# Patient Record
Sex: MALE | Race: WHITE | NOT HISPANIC OR LATINO | Employment: FULL TIME | URBAN - NONMETROPOLITAN AREA
[De-identification: names, ages, dates, MRNs, and addresses within clinical notes are randomized per-mention and may not be internally consistent; named-entity substitution may affect disease eponyms.]

---

## 2023-10-03 ENCOUNTER — OFFICE VISIT (OUTPATIENT)
Dept: OTOLARYNGOLOGY | Facility: CLINIC | Age: 33
End: 2023-10-03
Payer: COMMERCIAL

## 2023-10-03 DIAGNOSIS — J00 ACUTE RHINITIS: ICD-10-CM

## 2023-10-03 DIAGNOSIS — R04.0 EPISTAXIS: Primary | ICD-10-CM

## 2023-10-03 DIAGNOSIS — J34.89 NASAL CRUSTING: ICD-10-CM

## 2023-10-03 PROCEDURE — 99203 OFFICE O/P NEW LOW 30 MIN: CPT | Performed by: OTOLARYNGOLOGY

## 2023-10-03 PROCEDURE — 31231 NASAL ENDOSCOPY DX: CPT | Performed by: OTOLARYNGOLOGY

## 2023-10-03 RX ORDER — BACITRACIN 500 [USP'U]/G
1 OINTMENT TOPICAL 3 TIMES DAILY
Qty: 9 G | Refills: 0 | Status: SHIPPED | OUTPATIENT
Start: 2023-10-03 | End: 2023-11-02

## 2023-10-03 NOTE — PROGRESS NOTES
Subjective   Patient ID: Dhaval Gregorio is a 33 y.o. male who presents for chronic nose bleeds.    Subjective   Patient ID: Dhaval Gregorio is a 33 y.o. male who presents for chronic nose bleeds.    HPI  He has nosebleeds for about a month. He was getting nosebleeds 3-4 times a week. Left side of his nose was cauterized and was packed last week. His nose bleed again. He had a rhinorocket placed by ER, it was removed yesterday.    He is not taking any NSAIDS.    Review of Systems  History of nosebleeds.    Objective   Physical Exam  General appearance: Healthy-appearing, well-nourished, well groomed, in no acute distress.     Head and Face: Atraumatic with no masses, lesions, or scarring.      Salivary glands: No tenderness of the parotid glands or parotid masses.     No tenderness of the submandibular glands or submandibular masses.      Facial strength: Normal strength and symmetry, no synkinesis or facial tic.     Eyes: Conjunctivas look non-hyperemic bilaterally    Ears: Bilaterally ear canals look normal. Tympanic membranes look intact, no hyperemia, fluid or retraction. Hearing grossly normal.      Nose: Please see endoscopy    Oral Cavity/Mouth: Lips and tongue look normal.     Throat: No postnasal discharge. No tonsil hypertrophy. No hyperemia.    Neck: Symmetrical, trachea midline.     Pulmonary: Normal respiratory effort.     Lymphatic: No palpable pathologic lymph nodes at neck.     Neurological/Psychiatric Orientation to person, place, and time: Normal.     Mood and affect: Normal.      Extremities: No clubbing.     Skin: No significant skin lesions were noted at face or neck    NASAL ENDOSCOPY 10.03.2023  0 degree nasal endoscope was advanced through patient's nasal cavities. On examination patient's septum was deviated to right (anteriorly) and left (inferiorly). Mucosa looked pale normal hyperemic. Crusting /scabbing was (+) anteriorly at left. There was whitish powder on it. No polyps, but  purulent secretions were observed.     Assessment/Plan   1- Continue oral keflex as prescribed by outside health facility  2-bacitracin with zinc ointment 3 times a day inside the nose for 3 weeks  3-use Afrin nasal spray on a piece of cotton and applied inside the nose for active nosebleeds  4-follow-up in 3 weeks             He has nosebleeds for about a month. He was getting nosebleeds 3-4 times a week. Left side of his nose was cauterized and was packed last week. His nose bleed again. He had a rhinorocket placed by ER, it was removed yesterday.    He is not taking any NSAIDS.    Review of Systems  History of nosebleeds.    Objective   Physical Exam  General appearance: Healthy-appearing, well-nourished, well groomed, in no acute distress.     Head and Face: Atraumatic with no masses, lesions, or scarring.      Salivary glands: No tenderness of the parotid glands or parotid masses.     No tenderness of the submandibular glands or submandibular masses.      Facial strength: Normal strength and symmetry, no synkinesis or facial tic.     Eyes: Conjunctivas look non-hyperemic bilaterally    Ears: Bilaterally ear canals look normal. Tympanic membranes look intact, no hyperemia, fluid or retraction. Hearing grossly normal.      Nose: Please see endoscopy    Oral Cavity/Mouth: Lips and tongue look normal.     Throat: No postnasal discharge. No tonsil hypertrophy. No hyperemia.    Neck: Symmetrical, trachea midline.     Pulmonary: Normal respiratory effort.     Lymphatic: No palpable pathologic lymph nodes at neck.     Neurological/Psychiatric Orientation to person, place, and time: Normal.     Mood and affect: Normal.      Extremities: No clubbing.     Skin: No significant skin lesions were noted at face or neck    NASAL ENDOSCOPY 10.03.2023  0 degree nasal endoscope was advanced through patient's nasal cavities. On examination patient's septum was deviated to right (anteriorly) and left (inferiorly). Mucosa looked  pale normal hyperemic. Crusting /scabbing was (+) anteriorly at left. There was whitish powder on it. No polyps, but purulent secretions were observed.     Assessment/Plan   1- Continue oral keflex as prescribed by outside health facility  2-bacitracin with zinc ointment 3 times a day inside the nose for 3 weeks  3-use Afrin nasal spray on a piece of cotton and applied inside the nose for active nosebleeds  4-follow-up in 3 weeks

## 2023-10-03 NOTE — PROGRESS NOTES
Subjective   Patient ID: Dhaval Gregorio is a 33 y.o. male who presents for chronic nose bleeds.    Subjective   Patient ID: Dhaval Gregorio is a 33 y.o. male who presents for chronic nose bleeds.     HPI  He has nosebleeds for about a month. He was getting nosebleeds 3-4 times a week. Left side of his nose was cauterized and was packed last week. His nose bleed again. He had a rhinorocket placed by ER, it was removed yesterday.     He is not taking any NSAIDS.     Review of Systems  History of nosebleeds.     Objective   Physical Exam  General appearance: Healthy-appearing, well-nourished, well groomed, in no acute distress.      Head and Face: Atraumatic with no masses, lesions, or scarring.       Salivary glands: No tenderness of the parotid glands or parotid masses.      No tenderness of the submandibular glands or submandibular masses.       Facial strength: Normal strength and symmetry, no synkinesis or facial tic.      Eyes: Conjunctivas look non-hyperemic bilaterally     Ears: Bilaterally ear canals look normal. Tympanic membranes look intact, no hyperemia, fluid or retraction. Hearing grossly normal.       Nose: Please see endoscopy     Oral Cavity/Mouth: Lips and tongue look normal.      Throat: No postnasal discharge. No tonsil hypertrophy. No hyperemia.     Neck: Symmetrical, trachea midline.      Pulmonary: Normal respiratory effort.      Lymphatic: No palpable pathologic lymph nodes at neck.      Neurological/Psychiatric Orientation to person, place, and time: Normal.     Mood and affect: Normal.       Extremities: No clubbing.      Skin: No significant skin lesions were noted at face or neck     NASAL ENDOSCOPY 10.03.2023  0 degree nasal endoscope was advanced through patient's nasal cavities. On examination patient's septum was deviated to right (anteriorly) and left (inferiorly). Mucosa looked pale normal hyperemic. Crusting /scabbing was (+) anteriorly at left. There was whitish powder on  it. No polyps, but purulent secretions were observed.      Assessment/Plan   1- Continue oral keflex as prescribed by outside health facility  2-bacitracin with zinc ointment 3 times a day inside the nose for 3 weeks  3-use Afrin nasal spray on a piece of cotton and applied inside the nose for active nosebleeds  4-follow-up in 3 weeks                 He has nosebleeds for about a month. He was getting nosebleeds 3-4 times a week. Left side of his nose was cauterized and was packed last week. His nose bleed again. He had a rhinorocket placed by ER, it was removed yesterday.     He is not taking any NSAIDS.     Review of Systems  History of nosebleeds.           Objective   Physical Exam  General appearance: Healthy-appearing, well-nourished, well groomed, in no acute distress.      Head and Face: Atraumatic with no masses, lesions, or scarring.       Salivary glands: No tenderness of the parotid glands or parotid masses.      No tenderness of the submandibular glands or submandibular masses.       Facial strength: Normal strength and symmetry, no synkinesis or facial tic.      Eyes: Conjunctivas look non-hyperemic bilaterally     Ears: Bilaterally ear canals look normal. Tympanic membranes look intact, no hyperemia, fluid or retraction. Hearing grossly normal.       Nose: Please see endoscopy     Oral Cavity/Mouth: Lips and tongue look normal.      Throat: No postnasal discharge. No tonsil hypertrophy. No hyperemia.     Neck: Symmetrical, trachea midline.      Pulmonary: Normal respiratory effort.      Lymphatic: No palpable pathologic lymph nodes at neck.      Neurological/Psychiatric Orientation to person, place, and time: Normal.     Mood and affect: Normal.       Extremities: No clubbing.      Skin: No significant skin lesions were noted at face or neck     NASAL ENDOSCOPY 10.03.2023  0 degree nasal endoscope was advanced through patient's nasal cavities. On examination patient's septum was deviated to right  (anteriorly) and left (inferiorly). Mucosa looked pale normal hyperemic. Crusting /scabbing was (+) anteriorly at left. There was whitish powder on it. No polyps, but purulent secretions were observed.            Assessment/Plan   1- Continue oral keflex as prescribed by outside health facility  2-bacitracin with zinc ointment 3 times a day inside the nose for 3 weeks  3-use Afrin nasal spray on a piece of cotton and applied inside the nose for active nosebleeds  4-follow-up in 3 weeks                  My Note 9:56 AM    Edit     Delete     Copy    Expand All Collapse All     Subjective   Patient ID: Dhaval Gregorio is a 33 y.o. male who presents for chronic nose bleeds.     HPI  He has nosebleeds for about a month. He was getting nosebleeds 3-4 times a week. Left side of his nose was cauterized and was packed last week. His nose bleed again. He had a rhinorocket placed by ER, it was removed yesterday.     He is not taking any NSAIDS.     Review of Systems  History of nosebleeds.           Objective   Physical Exam  General appearance: Healthy-appearing, well-nourished, well groomed, in no acute distress.      Head and Face: Atraumatic with no masses, lesions, or scarring.       Salivary glands: No tenderness of the parotid glands or parotid masses.      No tenderness of the submandibular glands or submandibular masses.       Facial strength: Normal strength and symmetry, no synkinesis or facial tic.      Eyes: Conjunctivas look non-hyperemic bilaterally     Ears: Bilaterally ear canals look normal. Tympanic membranes look intact, no hyperemia, fluid or retraction. Hearing grossly normal.       Nose: Please see endoscopy     Oral Cavity/Mouth: Lips and tongue look normal.      Throat: No postnasal discharge. No tonsil hypertrophy. No hyperemia.     Neck: Symmetrical, trachea midline.      Pulmonary: Normal respiratory effort.      Lymphatic: No palpable pathologic lymph nodes at neck.       Neurological/Psychiatric Orientation to person, place, and time: Normal.     Mood and affect: Normal.       Extremities: No clubbing.      Skin: No significant skin lesions were noted at face or neck     NASAL ENDOSCOPY 10.03.2023  0 degree nasal endoscope was advanced through patient's nasal cavities. On examination patient's septum was deviated to right (anteriorly) and left (inferiorly). Mucosa looked pale normal hyperemic. Crusting /scabbing was (+) anteriorly at left. There was whitish powder on it. No polyps, but purulent secretions were observed.            Assessment/Plan   1- Continue oral keflex as prescribed by outside health facility  2-bacitracin with zinc ointment 3 times a day inside the nose for 3 weeks  3-use Afrin nasal spray on a piece of cotton and applied inside the nose for active nosebleeds  4-follow-up in 3 weeks          Epistaxis (Nose Bleed)   The bleeding has been from both nares. This is a recurrent problem.     He has nosebleeds for about a month. He was getting nosebleeds 3-4 times a week. Left side of his nose was cauterized and was packed last week. His nose bleed again. He had a rhinorocket placed by ER, it was removed yesterday.    He is not taking any NSAIDS.    Review of Systems   HENT:  Positive for nosebleeds.      History of nosebleeds.    Objective   Physical Exam  General appearance: Healthy-appearing, well-nourished, well groomed, in no acute distress.     Head and Face: Atraumatic with no masses, lesions, or scarring.      Salivary glands: No tenderness of the parotid glands or parotid masses.     No tenderness of the submandibular glands or submandibular masses.      Facial strength: Normal strength and symmetry, no synkinesis or facial tic.     Eyes: Conjunctivas look non-hyperemic bilaterally    Ears: Bilaterally ear canals look normal. Tympanic membranes look intact, no hyperemia, fluid or retraction. Hearing grossly normal.      Nose: Please see endoscopy    Oral  Cavity/Mouth: Lips and tongue look normal.     Throat: No postnasal discharge. No tonsil hypertrophy. No hyperemia.    Neck: Symmetrical, trachea midline.     Pulmonary: Normal respiratory effort.     Lymphatic: No palpable pathologic lymph nodes at neck.     Neurological/Psychiatric Orientation to person, place, and time: Normal.     Mood and affect: Normal.      Extremities: No clubbing.     Skin: No significant skin lesions were noted at face or neck    NASAL ENDOSCOPY 10.03.2023  0 degree nasal endoscope was advanced through patient's nasal cavities. On examination patient's septum was deviated to right (anteriorly) and left (inferiorly). Mucosa looked pale normal hyperemic. Crusting /scabbing was (+) anteriorly at left. There was whitish powder on it. No polyps, but purulent secretions were observed.     Assessment/Plan   1- Continue oral keflex as prescribed by outside health facility  2-bacitracin with zinc ointment 3 times a day inside the nose for 3 weeks  3-use Afrin nasal spray on a piece of cotton and applied inside the nose for active nosebleeds  4-follow-up in 3 weeks

## 2023-10-03 NOTE — PROGRESS NOTES
Subjective   Patient ID: Dhaval Gregorio is a 33 y.o. male who presents for chronic nose bleeds.    HPI  He has nosebleeds for about a month. He was getting nosebleeds 3-4 times a week. Left side of his nose was cauterized and was packed last week. His nose bleed again. He had a rhinorocket placed by ER, it was removed yesterday.    He is not taking any NSAIDS.    Review of Systems  History of nosebleeds.    Objective   Physical Exam  General appearance: Healthy-appearing, well-nourished, well groomed, in no acute distress.     Head and Face: Atraumatic with no masses, lesions, or scarring.      Salivary glands: No tenderness of the parotid glands or parotid masses.     No tenderness of the submandibular glands or submandibular masses.      Facial strength: Normal strength and symmetry, no synkinesis or facial tic.     Eyes: Conjunctivas look non-hyperemic bilaterally    Ears: Bilaterally ear canals look normal. Tympanic membranes look intact, no hyperemia, fluid or retraction. Hearing grossly normal.      Nose: Please see endoscopy    Oral Cavity/Mouth: Lips and tongue look normal.     Throat: No postnasal discharge. No tonsil hypertrophy. No hyperemia.    Neck: Symmetrical, trachea midline.     Pulmonary: Normal respiratory effort.     Lymphatic: No palpable pathologic lymph nodes at neck.     Neurological/Psychiatric Orientation to person, place, and time: Normal.     Mood and affect: Normal.      Extremities: No clubbing.     Skin: No significant skin lesions were noted at face or neck    NASAL ENDOSCOPY 10.03.2023  0 degree nasal endoscope was advanced through patient's nasal cavities. On examination patient's septum was deviated to right (anteriorly) and left (inferiorly). Mucosa looked pale normal hyperemic. Crusting /scabbing was (+) anteriorly at left. There was whitish powder on it. No polyps, but purulent secretions were observed.     Assessment/Plan   1- Continue oral keflex as prescribed by outside  health facility  2-bacitracin with zinc ointment 3 times a day inside the nose for 3 weeks  3-use Afrin nasal spray on a piece of cotton and applied inside the nose for active nosebleeds  4-follow-up in 3 weeks

## 2023-10-24 ENCOUNTER — OFFICE VISIT (OUTPATIENT)
Dept: OTOLARYNGOLOGY | Facility: CLINIC | Age: 33
End: 2023-10-24
Payer: COMMERCIAL

## 2023-10-24 DIAGNOSIS — J34.89 NASAL CRUSTING: ICD-10-CM

## 2023-10-24 DIAGNOSIS — R04.0 EPISTAXIS: Primary | ICD-10-CM

## 2023-10-24 PROCEDURE — 99213 OFFICE O/P EST LOW 20 MIN: CPT | Performed by: OTOLARYNGOLOGY

## 2023-10-24 NOTE — PROGRESS NOTES
History Of Present Illness  Dhaval Gregorio is a 33 y.o. male presenting with      Past Medical History  He has no past medical history on file.    Surgical History  He has no past surgical history on file.     Social History  He has no history on file for tobacco use, alcohol use, and drug use.    Family History  No family history on file.     Allergies  Patient has no known allergies.    Review of Systems   ROS        Physical Exam    Constitutional   General appearance: Healthy-appearing, well-nourished, well groomed, in no acute distress.      Head and Face   Head and face: Atraumatic with no masses, lesions, or scarring.    Salivary glands: No tenderness of the parotid glands or parotid masses. No tenderness of the submandibular glands or submandibular masses.    Facial strength: Normal strength and symmetry, no synkinesis or facial tic.     Eyes   Pupils and irises: EOM intact, conjunctiva non-injected.     Ears  Otoscopic examination: Auditory canals with normal appearance and no obstruction or erythema. Membranes mobile per pneumatic otoscopy, pearly grey, with clear landmarks. No evidence of middle ear effusion.  External inspection of ears: Ears normally formed and free of lesions.     Nose   External inspection of nose: Dorsum symmetric with no visible or palpable deformities.  No nasal lesions, lacerations, or scars. Nasal tip symmetrical with normal nasal valves.    Nasal mucosa, septum, and turbinates: Mucosa normal, pink and moist. Septum not markedly deformed. Turbinates normal in size and confrontation.     Oral Cavity/Mouth   Normal lips, gums. Tongue normal, no lesions or edema.    Throat   Oropharynx: Mucosa moist, no lesions. Hard and soft palate normal.  No tonsillar masses or lesions.     Neck   Neck: Symmetrical, trachea midline.      Pulmonary   Respiratory effort: Chest expands symmetrically. No audible wheeze.      Cardiovascular   Peripheral vascular system:  No jugular venous  distension    Lymphatic   Palpation of cervical lymph nodes: No palpable lymph node enlargement, no submandibular adenopathy, no anterior cervical adenopathy,     Neurological/Psychiatric   Orientation to person, place, and time: Normal.     Mood and affect: Normal.      Extremities   Appearance of extremities: Normal.        Procedure       Last Recorded Vitals  There were no vitals taken for this visit.    Relevant Results  Prior to Admission medications    Medication Sig Start Date End Date Taking? Authorizing Provider   bacitracin 500 unit/gram ointment Apply 1 Application topically 3 times a day. 10/3/23 11/2/23  Aureliano Mcneil MD     No results found.      Assessment/Plan   @  Problem List Items Addressed This Visit    None         Otolaryngology - Head & Neck Surgery

## 2023-10-24 NOTE — PROGRESS NOTES
History Of Present Illness  Dhaval Gregorio is a 33 y.o. male who presents for chronic nose bleeds.    HPI    10.24.2023: No nosebleeds from his left side since his last visit, he had 2 nosebleeds from the right side.  On examination, there was still dryness, mild crusting at left anterior part of nasal septal mucosa.  There was mild dryness and a focal area of mild mucosal injury at right side.      I recommend to continue the antibiotic ointment for 3 more weeks and then switch to coconut oil daily.  Patient calls us, if he starts to get frequent nosebleeds again.    _________________________________________________________________    10.03.2023: He has nosebleeds for about a month. He was getting nosebleeds 3-4 times a week. Left side of his nose was cauterized and was packed last week. His nose bleed again. He had a rhinorocket placed by ER, it was removed yesterday.    Medical History   He has no past medical history on file.    Surgical History  He has no past surgical history on file.     Social History  He has no history on file for tobacco use, alcohol use, and drug use.    Family History  No family history on file.     Allergies  Patient has no known allergies.    Review of Systems   nosebleeds     Physical Exam    (Old exam)  General appearance: Healthy-appearing, well-nourished, well groomed, in no acute distress.     Head and Face: Atraumatic with no masses, lesions, or scarring.      Salivary glands: No tenderness of the parotid glands or parotid masses.     No tenderness of the submandibular glands or submandibular masses.      Facial strength: Normal strength and symmetry, no synkinesis or facial tic.     Eyes: Conjunctivas look non-hyperemic bilaterally    Ears: Bilaterally ear canals look normal. Tympanic membranes look intact, no hyperemia, fluid or retraction. Hearing grossly normal.      Nose: Mucosa looks normal. No purulent discharge. Septum essentially straight.     Oral Cavity/Mouth:  Lips and tongue look normal.     Throat: No postnasal discharge. No tonsil hypertrophy. No hyperemia.    Neck: Symmetrical, trachea midline.     Pulmonary: Normal respiratory effort.     Lymphatic: No palpable pathologic lymph nodes at neck.     Neurological/Psychiatric Orientation to person, place, and time: Normal.     Mood and affect: Normal.      Extremities: No clubbing.     Skin: No significant skin lesions were noted at face or neck          Last Recorded Vitals  There were no vitals taken for this visit.    Relevant Results  Prior to Admission medications    Medication Sig Start Date End Date Taking? Authorizing Provider   bacitracin 500 unit/gram ointment Apply 1 Application topically 3 times a day. 10/3/23 11/2/23  Aureliano Mcneil MD     No results found.      Assessment/Plan     Problem List Items Addressed This Visit       Epistaxis - Primary    Nasal crusting      10.24.2023: No nosebleeds from his left side since his last visit, he had 2 nosebleeds from the right side.  On examination, there was still dryness, mild crusting at left anterior part of nasal septal mucosa.  There was mild dryness and a focal area of mild mucosal injury at right side.      I recommend to continue the antibiotic ointment for 3 more weeks and then switch to coconut oil daily.  Patient calls us, if he starts to get frequent nosebleeds again.    _________________________________________________________________      Aureliano Mcneil MD  Otolaryngology - Head & Neck Surgery
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
## 2024-08-21 ENCOUNTER — APPOINTMENT (OUTPATIENT)
Dept: PRIMARY CARE | Facility: CLINIC | Age: 34
End: 2024-08-21
Payer: COMMERCIAL

## 2024-08-21 VITALS — WEIGHT: 252.4 LBS | DIASTOLIC BLOOD PRESSURE: 70 MMHG | HEART RATE: 79 BPM | SYSTOLIC BLOOD PRESSURE: 107 MMHG

## 2024-08-21 DIAGNOSIS — Z76.89 ENCOUNTER TO ESTABLISH CARE: Primary | ICD-10-CM

## 2024-08-21 DIAGNOSIS — K21.9 GASTROESOPHAGEAL REFLUX DISEASE WITHOUT ESOPHAGITIS: ICD-10-CM

## 2024-08-21 DIAGNOSIS — M62.830 MUSCLE SPASM OF BACK: ICD-10-CM

## 2024-08-21 DIAGNOSIS — Z00.00 ROUTINE GENERAL MEDICAL EXAMINATION AT A HEALTH CARE FACILITY: ICD-10-CM

## 2024-08-21 PROCEDURE — 99395 PREV VISIT EST AGE 18-39: CPT | Performed by: REGISTERED NURSE

## 2024-08-21 RX ORDER — OMEPRAZOLE 20 MG/1
20 CAPSULE, DELAYED RELEASE ORAL DAILY
COMMUNITY
End: 2024-08-21 | Stop reason: SDUPTHER

## 2024-08-21 RX ORDER — MELOXICAM 15 MG/1
1 TABLET ORAL DAILY
COMMUNITY
Start: 2024-05-22

## 2024-08-21 RX ORDER — DULOXETIN HYDROCHLORIDE 60 MG/1
1 CAPSULE, DELAYED RELEASE ORAL DAILY
COMMUNITY
Start: 2023-02-10

## 2024-08-21 RX ORDER — OMEPRAZOLE 40 MG/1
1 CAPSULE, DELAYED RELEASE ORAL DAILY
COMMUNITY
Start: 2024-06-17

## 2024-08-21 RX ORDER — EPINEPHRINE 0.3 MG/.3ML
INJECTION SUBCUTANEOUS
COMMUNITY
Start: 2024-04-03

## 2024-08-21 RX ORDER — CYCLOBENZAPRINE HCL 5 MG
5 TABLET ORAL NIGHTLY PRN
Qty: 30 TABLET | Refills: 0 | Status: SHIPPED | OUTPATIENT
Start: 2024-08-21 | End: 2024-09-20

## 2024-08-21 ASSESSMENT — ENCOUNTER SYMPTOMS
VOMITING: 0
SHORTNESS OF BREATH: 0
EYE REDNESS: 0
DIARRHEA: 0
BACK PAIN: 1
DIZZINESS: 0
WHEEZING: 0
HEADACHES: 0
NAUSEA: 0
FEVER: 0
WOUND: 0
EYE DISCHARGE: 0
FREQUENCY: 0
RHINORRHEA: 0
DIFFICULTY URINATING: 0
CONSTIPATION: 0
ABDOMINAL PAIN: 0
NERVOUS/ANXIOUS: 0
COUGH: 0
CHILLS: 0
WEAKNESS: 0
CONFUSION: 0

## 2024-08-28 ENCOUNTER — LAB (OUTPATIENT)
Dept: LAB | Facility: LAB | Age: 34
End: 2024-08-28
Payer: COMMERCIAL

## 2024-08-28 DIAGNOSIS — Z00.00 ROUTINE GENERAL MEDICAL EXAMINATION AT A HEALTH CARE FACILITY: ICD-10-CM

## 2024-08-28 LAB
ALBUMIN SERPL BCP-MCNC: 4.3 G/DL (ref 3.4–5)
ALP SERPL-CCNC: 49 U/L (ref 33–120)
ALT SERPL W P-5'-P-CCNC: 3 U/L (ref 10–52)
ANION GAP SERPL CALC-SCNC: 11 MMOL/L (ref 10–20)
AST SERPL W P-5'-P-CCNC: 9 U/L (ref 9–39)
BASOPHILS # BLD AUTO: 0.03 X10*3/UL (ref 0–0.1)
BASOPHILS NFR BLD AUTO: 0.7 %
BILIRUB SERPL-MCNC: 1.3 MG/DL (ref 0–1.2)
BUN SERPL-MCNC: 11 MG/DL (ref 6–23)
CALCIUM SERPL-MCNC: 9.8 MG/DL (ref 8.6–10.3)
CHLORIDE SERPL-SCNC: 104 MMOL/L (ref 98–107)
CHOLEST SERPL-MCNC: 158 MG/DL (ref 0–199)
CHOLESTEROL/HDL RATIO: 3.8
CO2 SERPL-SCNC: 28 MMOL/L (ref 21–32)
CREAT SERPL-MCNC: 0.81 MG/DL (ref 0.5–1.3)
EGFRCR SERPLBLD CKD-EPI 2021: >90 ML/MIN/1.73M*2
EOSINOPHIL # BLD AUTO: 0.08 X10*3/UL (ref 0–0.7)
EOSINOPHIL NFR BLD AUTO: 1.8 %
ERYTHROCYTE [DISTWIDTH] IN BLOOD BY AUTOMATED COUNT: 12.2 % (ref 11.5–14.5)
GLUCOSE SERPL-MCNC: 76 MG/DL (ref 74–99)
HCT VFR BLD AUTO: 38.5 % (ref 41–52)
HDLC SERPL-MCNC: 42 MG/DL
HGB BLD-MCNC: 12.7 G/DL (ref 13.5–17.5)
IMM GRANULOCYTES # BLD AUTO: 0.01 X10*3/UL (ref 0–0.7)
IMM GRANULOCYTES NFR BLD AUTO: 0.2 % (ref 0–0.9)
LDLC SERPL CALC-MCNC: 107 MG/DL
LYMPHOCYTES # BLD AUTO: 1.22 X10*3/UL (ref 1.2–4.8)
LYMPHOCYTES NFR BLD AUTO: 27.8 %
MCH RBC QN AUTO: 29.9 PG (ref 26–34)
MCHC RBC AUTO-ENTMCNC: 33 G/DL (ref 32–36)
MCV RBC AUTO: 91 FL (ref 80–100)
MONOCYTES # BLD AUTO: 0.36 X10*3/UL (ref 0.1–1)
MONOCYTES NFR BLD AUTO: 8.2 %
NEUTROPHILS # BLD AUTO: 2.69 X10*3/UL (ref 1.2–7.7)
NEUTROPHILS NFR BLD AUTO: 61.3 %
NON HDL CHOLESTEROL: 116 MG/DL (ref 0–149)
NRBC BLD-RTO: 0 /100 WBCS (ref 0–0)
PLATELET # BLD AUTO: 192 X10*3/UL (ref 150–450)
POTASSIUM SERPL-SCNC: 3.7 MMOL/L (ref 3.5–5.3)
PROT SERPL-MCNC: 6.9 G/DL (ref 6.4–8.2)
RBC # BLD AUTO: 4.25 X10*6/UL (ref 4.5–5.9)
SODIUM SERPL-SCNC: 139 MMOL/L (ref 136–145)
TRIGL SERPL-MCNC: 47 MG/DL (ref 0–149)
TSH SERPL-ACNC: 1.37 MIU/L (ref 0.44–3.98)
VLDL: 9 MG/DL (ref 0–40)
WBC # BLD AUTO: 4.4 X10*3/UL (ref 4.4–11.3)

## 2024-08-28 PROCEDURE — 85025 COMPLETE CBC W/AUTO DIFF WBC: CPT

## 2024-08-28 PROCEDURE — 36415 COLL VENOUS BLD VENIPUNCTURE: CPT

## 2024-08-28 PROCEDURE — 80061 LIPID PANEL: CPT

## 2024-08-28 PROCEDURE — 84443 ASSAY THYROID STIM HORMONE: CPT

## 2024-08-28 PROCEDURE — 80053 COMPREHEN METABOLIC PANEL: CPT

## 2024-09-20 DIAGNOSIS — M62.830 MUSCLE SPASM OF BACK: ICD-10-CM

## 2024-09-23 RX ORDER — CYCLOBENZAPRINE HCL 5 MG
TABLET ORAL
Qty: 30 TABLET | Refills: 0 | Status: SHIPPED | OUTPATIENT
Start: 2024-09-23

## 2024-09-24 DIAGNOSIS — R04.0 EPISTAXIS: ICD-10-CM

## 2024-09-24 DIAGNOSIS — K21.9 GASTROESOPHAGEAL REFLUX DISEASE WITHOUT ESOPHAGITIS: ICD-10-CM

## 2024-09-24 RX ORDER — OMEPRAZOLE 40 MG/1
40 CAPSULE, DELAYED RELEASE ORAL DAILY
Qty: 90 CAPSULE | Refills: 3 | Status: SHIPPED | OUTPATIENT
Start: 2024-09-24

## 2024-10-17 DIAGNOSIS — M62.830 MUSCLE SPASM OF BACK: ICD-10-CM

## 2024-10-17 RX ORDER — CYCLOBENZAPRINE HCL 5 MG
TABLET ORAL
Qty: 30 TABLET | Refills: 0 | Status: SHIPPED | OUTPATIENT
Start: 2024-10-17

## 2024-12-17 ENCOUNTER — OFFICE VISIT (OUTPATIENT)
Dept: PRIMARY CARE | Facility: CLINIC | Age: 34
End: 2024-12-17
Payer: COMMERCIAL

## 2024-12-17 VITALS — HEART RATE: 70 BPM | WEIGHT: 257.6 LBS | SYSTOLIC BLOOD PRESSURE: 157 MMHG | DIASTOLIC BLOOD PRESSURE: 101 MMHG

## 2024-12-17 DIAGNOSIS — R68.89 FLU-LIKE SYMPTOMS: ICD-10-CM

## 2024-12-17 DIAGNOSIS — J02.9 SORE THROAT: ICD-10-CM

## 2024-12-17 DIAGNOSIS — J06.9 UPPER RESPIRATORY TRACT INFECTION, UNSPECIFIED TYPE: ICD-10-CM

## 2024-12-17 LAB
POC RAPID INFLUENZA A: NEGATIVE
POC RAPID INFLUENZA B: NEGATIVE
POC RAPID STREP: NEGATIVE
POC SARS-COV-2 AG BINAX: NORMAL

## 2024-12-17 PROCEDURE — 87880 STREP A ASSAY W/OPTIC: CPT | Performed by: REGISTERED NURSE

## 2024-12-17 PROCEDURE — 87811 SARS-COV-2 COVID19 W/OPTIC: CPT | Performed by: REGISTERED NURSE

## 2024-12-17 PROCEDURE — 87804 INFLUENZA ASSAY W/OPTIC: CPT | Performed by: REGISTERED NURSE

## 2024-12-17 PROCEDURE — 99213 OFFICE O/P EST LOW 20 MIN: CPT | Performed by: REGISTERED NURSE

## 2024-12-17 RX ORDER — BENZONATATE 100 MG/1
100 CAPSULE ORAL 3 TIMES DAILY PRN
Qty: 42 CAPSULE | Refills: 0 | Status: SHIPPED | OUTPATIENT
Start: 2024-12-17 | End: 2025-01-16

## 2024-12-17 RX ORDER — AMOXICILLIN AND CLAVULANATE POTASSIUM 875; 125 MG/1; MG/1
875 TABLET, FILM COATED ORAL 2 TIMES DAILY
Qty: 20 TABLET | Refills: 0 | Status: SHIPPED | OUTPATIENT
Start: 2024-12-17 | End: 2024-12-27

## 2024-12-17 NOTE — PROGRESS NOTES
Subjective   Patient ID: Dhaval Gregorio is a 34 y.o. male who presents for Sick Visit (Started last Friday the 6th; sore and itchy throat with cough at first, last 4-5 days he's been congested, cough is worse, feeling feverish, body aches and chills, headaches; ).    HPI     Sick visit: Started last Friday (6th) had a cough, then started having congestion, sore throat, over the last 4-5 days he has been congested. Cough feels like it is worse now, feels feverish. Checked temp once 100f, having body aches, chills, headaches. Productive yellow mucus. Thought he was getting better, then Sunday into Monday he felt like he had the flu. Chills, aches. Vomiting 3 times that night. No diarrhea.     Swabbed for flu, strep, covid today- negative.     All other systems reviewed and negative for complaint unless stated above.      Review of Systems    Objective   BP (!) 157/101 (BP Location: Left arm, Patient Position: Sitting, BP Cuff Size: Large adult)   Pulse 70   Wt 117 kg (257 lb 9.6 oz)     Physical Exam  Constitutional:       Appearance: Normal appearance.   HENT:      Mouth/Throat:      Mouth: Mucous membranes are moist.      Pharynx: Posterior oropharyngeal erythema present.   Cardiovascular:      Rate and Rhythm: Normal rate and regular rhythm.   Pulmonary:      Effort: Pulmonary effort is normal.      Breath sounds: Normal breath sounds.   Skin:     General: Skin is warm and dry.   Neurological:      Mental Status: He is alert and oriented to person, place, and time. Mental status is at baseline.   Psychiatric:         Mood and Affect: Mood normal.         Behavior: Behavior normal.         Assessment/Plan   Diagnoses and all orders for this visit:  Sore throat  -     POCT rapid strep A manually resulted  Flu-like symptoms  -     POCT Influenza A/B manually resulted  Upper respiratory tract infection, unspecified type  -     POCT BinaxNOW Covid-19 Ag Card manually resulted  -     amoxicillin-pot clavulanate  (Augmentin) 875-125 mg tablet; Take 1 tablet (875 mg) by mouth 2 times a day for 10 days.  -     benzonatate (Tessalon) 100 mg capsule; Take 1 capsule (100 mg) by mouth 3 times a day as needed for cough. Do not crush or chew.

## 2025-01-07 PROBLEM — J06.9 UPPER RESPIRATORY TRACT INFECTION: Status: ACTIVE | Noted: 2025-01-07

## 2025-01-14 ENCOUNTER — HOSPITAL ENCOUNTER (EMERGENCY)
Facility: HOSPITAL | Age: 35
Discharge: HOME | End: 2025-01-14
Attending: FAMILY MEDICINE
Payer: COMMERCIAL

## 2025-01-14 ENCOUNTER — APPOINTMENT (OUTPATIENT)
Dept: RADIOLOGY | Facility: HOSPITAL | Age: 35
End: 2025-01-14
Payer: COMMERCIAL

## 2025-01-14 VITALS
HEIGHT: 73 IN | WEIGHT: 250 LBS | RESPIRATION RATE: 17 BRPM | DIASTOLIC BLOOD PRESSURE: 71 MMHG | HEART RATE: 92 BPM | OXYGEN SATURATION: 96 % | BODY MASS INDEX: 33.13 KG/M2 | TEMPERATURE: 98.1 F | SYSTOLIC BLOOD PRESSURE: 118 MMHG

## 2025-01-14 DIAGNOSIS — U07.1 COVID-19 VIRUS INFECTION: Primary | ICD-10-CM

## 2025-01-14 DIAGNOSIS — J20.9 ACUTE BRONCHITIS, UNSPECIFIED ORGANISM: ICD-10-CM

## 2025-01-14 LAB
ALBUMIN SERPL BCP-MCNC: 4.5 G/DL (ref 3.4–5)
ALP SERPL-CCNC: 57 U/L (ref 33–120)
ALT SERPL W P-5'-P-CCNC: 3 U/L (ref 10–52)
ANION GAP SERPL CALC-SCNC: 10 MMOL/L (ref 10–20)
AST SERPL W P-5'-P-CCNC: 16 U/L (ref 9–39)
BASOPHILS # BLD AUTO: 0.03 X10*3/UL (ref 0–0.1)
BASOPHILS NFR BLD AUTO: 0.3 %
BILIRUB SERPL-MCNC: 0.9 MG/DL (ref 0–1.2)
BUN SERPL-MCNC: 9 MG/DL (ref 6–23)
CALCIUM SERPL-MCNC: 9.8 MG/DL (ref 8.6–10.3)
CHLORIDE SERPL-SCNC: 103 MMOL/L (ref 98–107)
CO2 SERPL-SCNC: 28 MMOL/L (ref 21–32)
CREAT SERPL-MCNC: 0.89 MG/DL (ref 0.5–1.3)
EGFRCR SERPLBLD CKD-EPI 2021: >90 ML/MIN/1.73M*2
EOSINOPHIL # BLD AUTO: 0.05 X10*3/UL (ref 0–0.7)
EOSINOPHIL NFR BLD AUTO: 0.5 %
ERYTHROCYTE [DISTWIDTH] IN BLOOD BY AUTOMATED COUNT: 12.5 % (ref 11.5–14.5)
FLUAV RNA RESP QL NAA+PROBE: NOT DETECTED
FLUBV RNA RESP QL NAA+PROBE: NOT DETECTED
GLUCOSE SERPL-MCNC: 98 MG/DL (ref 74–99)
HCT VFR BLD AUTO: 41.1 % (ref 41–52)
HGB BLD-MCNC: 13.4 G/DL (ref 13.5–17.5)
HOLD SPECIMEN: NORMAL
HOLD SPECIMEN: NORMAL
IMM GRANULOCYTES # BLD AUTO: 0.03 X10*3/UL (ref 0–0.7)
IMM GRANULOCYTES NFR BLD AUTO: 0.3 % (ref 0–0.9)
LACTATE SERPL-SCNC: 0.7 MMOL/L (ref 0.4–2)
LYMPHOCYTES # BLD AUTO: 0.39 X10*3/UL (ref 1.2–4.8)
LYMPHOCYTES NFR BLD AUTO: 4.1 %
MCH RBC QN AUTO: 29.9 PG (ref 26–34)
MCHC RBC AUTO-ENTMCNC: 32.6 G/DL (ref 32–36)
MCV RBC AUTO: 92 FL (ref 80–100)
MONOCYTES # BLD AUTO: 0.86 X10*3/UL (ref 0.1–1)
MONOCYTES NFR BLD AUTO: 9.1 %
NEUTROPHILS # BLD AUTO: 8.11 X10*3/UL (ref 1.2–7.7)
NEUTROPHILS NFR BLD AUTO: 85.7 %
NRBC BLD-RTO: 0 /100 WBCS (ref 0–0)
PLATELET # BLD AUTO: 200 X10*3/UL (ref 150–450)
POTASSIUM SERPL-SCNC: 4.4 MMOL/L (ref 3.5–5.3)
PROT SERPL-MCNC: 7.9 G/DL (ref 6.4–8.2)
RBC # BLD AUTO: 4.48 X10*6/UL (ref 4.5–5.9)
SARS-COV-2 RNA RESP QL NAA+PROBE: DETECTED
SODIUM SERPL-SCNC: 137 MMOL/L (ref 136–145)
WBC # BLD AUTO: 9.5 X10*3/UL (ref 4.4–11.3)

## 2025-01-14 PROCEDURE — 87636 SARSCOV2 & INF A&B AMP PRB: CPT | Performed by: FAMILY MEDICINE

## 2025-01-14 PROCEDURE — 71046 X-RAY EXAM CHEST 2 VIEWS: CPT

## 2025-01-14 PROCEDURE — 71046 X-RAY EXAM CHEST 2 VIEWS: CPT | Mod: FOREIGN READ | Performed by: RADIOLOGY

## 2025-01-14 PROCEDURE — 96374 THER/PROPH/DIAG INJ IV PUSH: CPT | Mod: 59

## 2025-01-14 PROCEDURE — 94640 AIRWAY INHALATION TREATMENT: CPT

## 2025-01-14 PROCEDURE — 96375 TX/PRO/DX INJ NEW DRUG ADDON: CPT | Mod: 59

## 2025-01-14 PROCEDURE — 83605 ASSAY OF LACTIC ACID: CPT | Performed by: FAMILY MEDICINE

## 2025-01-14 PROCEDURE — 71275 CT ANGIOGRAPHY CHEST: CPT

## 2025-01-14 PROCEDURE — 2500000004 HC RX 250 GENERAL PHARMACY W/ HCPCS (ALT 636 FOR OP/ED): Performed by: FAMILY MEDICINE

## 2025-01-14 PROCEDURE — 2500000001 HC RX 250 WO HCPCS SELF ADMINISTERED DRUGS (ALT 637 FOR MEDICARE OP)

## 2025-01-14 PROCEDURE — 36415 COLL VENOUS BLD VENIPUNCTURE: CPT | Performed by: FAMILY MEDICINE

## 2025-01-14 PROCEDURE — 80053 COMPREHEN METABOLIC PANEL: CPT | Performed by: FAMILY MEDICINE

## 2025-01-14 PROCEDURE — 71275 CT ANGIOGRAPHY CHEST: CPT | Mod: FOREIGN READ | Performed by: RADIOLOGY

## 2025-01-14 PROCEDURE — 2500000004 HC RX 250 GENERAL PHARMACY W/ HCPCS (ALT 636 FOR OP/ED)

## 2025-01-14 PROCEDURE — 87040 BLOOD CULTURE FOR BACTERIA: CPT | Mod: CONLAB | Performed by: FAMILY MEDICINE

## 2025-01-14 PROCEDURE — 96361 HYDRATE IV INFUSION ADD-ON: CPT

## 2025-01-14 PROCEDURE — 2550000001 HC RX 255 CONTRASTS: Performed by: FAMILY MEDICINE

## 2025-01-14 PROCEDURE — 85025 COMPLETE CBC W/AUTO DIFF WBC: CPT | Performed by: FAMILY MEDICINE

## 2025-01-14 PROCEDURE — 99285 EMERGENCY DEPT VISIT HI MDM: CPT | Mod: 25 | Performed by: FAMILY MEDICINE

## 2025-01-14 RX ORDER — KETOROLAC TROMETHAMINE 30 MG/ML
30 INJECTION, SOLUTION INTRAMUSCULAR; INTRAVENOUS ONCE
Status: COMPLETED | OUTPATIENT
Start: 2025-01-14 | End: 2025-01-14

## 2025-01-14 RX ORDER — ONDANSETRON HYDROCHLORIDE 2 MG/ML
4 INJECTION, SOLUTION INTRAVENOUS ONCE
Status: COMPLETED | OUTPATIENT
Start: 2025-01-14 | End: 2025-01-14

## 2025-01-14 RX ORDER — DOXYCYCLINE 100 MG/1
100 TABLET ORAL 2 TIMES DAILY
Qty: 20 TABLET | Refills: 0 | Status: SHIPPED | OUTPATIENT
Start: 2025-01-14 | End: 2025-01-24

## 2025-01-14 RX ORDER — ACETAMINOPHEN 325 MG/1
TABLET ORAL
Status: COMPLETED
Start: 2025-01-14 | End: 2025-01-14

## 2025-01-14 RX ORDER — ACETAMINOPHEN 325 MG/1
650 TABLET ORAL ONCE
Status: COMPLETED | OUTPATIENT
Start: 2025-01-14 | End: 2025-01-14

## 2025-01-14 RX ORDER — ALBUTEROL SULFATE 90 UG/1
INHALANT RESPIRATORY (INHALATION)
Status: COMPLETED
Start: 2025-01-14 | End: 2025-01-14

## 2025-01-14 RX ORDER — ONDANSETRON HYDROCHLORIDE 2 MG/ML
INJECTION, SOLUTION INTRAVENOUS
Status: COMPLETED
Start: 2025-01-14 | End: 2025-01-14

## 2025-01-14 RX ORDER — ONDANSETRON 4 MG/1
TABLET, ORALLY DISINTEGRATING ORAL
Status: DISCONTINUED
Start: 2025-01-14 | End: 2025-01-14 | Stop reason: WASHOUT

## 2025-01-14 RX ORDER — DEXAMETHASONE 4 MG/1
TABLET ORAL
Status: COMPLETED
Start: 2025-01-14 | End: 2025-01-14

## 2025-01-14 RX ORDER — ALBUTEROL SULFATE 90 UG/1
2 INHALANT RESPIRATORY (INHALATION) ONCE
Status: COMPLETED | OUTPATIENT
Start: 2025-01-14 | End: 2025-01-14

## 2025-01-14 RX ORDER — KETOROLAC TROMETHAMINE 30 MG/ML
INJECTION, SOLUTION INTRAMUSCULAR; INTRAVENOUS
Status: COMPLETED
Start: 2025-01-14 | End: 2025-01-14

## 2025-01-14 RX ORDER — PANTOPRAZOLE SODIUM 40 MG/10ML
INJECTION, POWDER, LYOPHILIZED, FOR SOLUTION INTRAVENOUS
Status: COMPLETED
Start: 2025-01-14 | End: 2025-01-14

## 2025-01-14 RX ORDER — PANTOPRAZOLE SODIUM 40 MG/10ML
40 INJECTION, POWDER, LYOPHILIZED, FOR SOLUTION INTRAVENOUS DAILY
Status: DISCONTINUED | OUTPATIENT
Start: 2025-01-14 | End: 2025-01-14 | Stop reason: HOSPADM

## 2025-01-14 RX ORDER — DEXAMETHASONE 6 MG/1
6 TABLET ORAL DAILY
Qty: 5 TABLET | Refills: 0 | Status: SHIPPED | OUTPATIENT
Start: 2025-01-14 | End: 2025-01-24

## 2025-01-14 RX ORDER — DOXYCYCLINE HYCLATE 100 MG
TABLET ORAL
Status: COMPLETED
Start: 2025-01-14 | End: 2025-01-14

## 2025-01-14 RX ORDER — DEXAMETHASONE 4 MG/1
6 TABLET ORAL ONCE
Status: COMPLETED | OUTPATIENT
Start: 2025-01-14 | End: 2025-01-14

## 2025-01-14 RX ORDER — DOXYCYCLINE HYCLATE 100 MG
100 TABLET ORAL ONCE
Status: COMPLETED | OUTPATIENT
Start: 2025-01-14 | End: 2025-01-14

## 2025-01-14 RX ADMIN — DEXAMETHASONE 6 MG: 4 TABLET ORAL at 15:44

## 2025-01-14 RX ADMIN — SODIUM CHLORIDE 1000 ML: 9 INJECTION, SOLUTION INTRAVENOUS at 12:20

## 2025-01-14 RX ADMIN — DOXYCYCLINE HYCLATE 100 MG: 100 TABLET, COATED ORAL at 15:45

## 2025-01-14 RX ADMIN — ACETAMINOPHEN 650 MG: 325 TABLET ORAL at 12:33

## 2025-01-14 RX ADMIN — KETOROLAC TROMETHAMINE 30 MG: 30 INJECTION, SOLUTION INTRAMUSCULAR; INTRAVENOUS at 13:38

## 2025-01-14 RX ADMIN — IOHEXOL 50 ML: 350 INJECTION, SOLUTION INTRAVENOUS at 14:28

## 2025-01-14 RX ADMIN — ALBUTEROL SULFATE 2 PUFF: 90 INHALANT RESPIRATORY (INHALATION) at 15:48

## 2025-01-14 RX ADMIN — ONDANSETRON 4 MG: 2 INJECTION INTRAMUSCULAR; INTRAVENOUS at 15:44

## 2025-01-14 RX ADMIN — PANTOPRAZOLE SODIUM 40 MG: 40 INJECTION, POWDER, FOR SOLUTION INTRAVENOUS at 13:38

## 2025-01-14 RX ADMIN — PANTOPRAZOLE SODIUM 40 MG: 40 INJECTION, POWDER, LYOPHILIZED, FOR SOLUTION INTRAVENOUS at 13:38

## 2025-01-14 RX ADMIN — ALBUTEROL SULFATE 2 PUFF: 90 AEROSOL, METERED RESPIRATORY (INHALATION) at 15:48

## 2025-01-14 RX ADMIN — ONDANSETRON HYDROCHLORIDE 4 MG: 2 INJECTION, SOLUTION INTRAVENOUS at 15:44

## 2025-01-14 RX ADMIN — KETOROLAC TROMETHAMINE 30 MG: 30 INJECTION, SOLUTION INTRAMUSCULAR at 13:38

## 2025-01-14 RX ADMIN — Medication 100 MG: at 15:45

## 2025-01-14 ASSESSMENT — COLUMBIA-SUICIDE SEVERITY RATING SCALE - C-SSRS
1. IN THE PAST MONTH, HAVE YOU WISHED YOU WERE DEAD OR WISHED YOU COULD GO TO SLEEP AND NOT WAKE UP?: NO
2. HAVE YOU ACTUALLY HAD ANY THOUGHTS OF KILLING YOURSELF?: NO
6. HAVE YOU EVER DONE ANYTHING, STARTED TO DO ANYTHING, OR PREPARED TO DO ANYTHING TO END YOUR LIFE?: NO

## 2025-01-14 ASSESSMENT — PAIN - FUNCTIONAL ASSESSMENT
PAIN_FUNCTIONAL_ASSESSMENT: 0-10

## 2025-01-14 ASSESSMENT — PAIN SCALES - GENERAL
PAINLEVEL_OUTOF10: 0 - NO PAIN

## 2025-01-14 ASSESSMENT — PAIN DESCRIPTION - PAIN TYPE
TYPE: ACUTE PAIN
TYPE: ACUTE PAIN

## 2025-01-14 ASSESSMENT — PAIN DESCRIPTION - PROGRESSION
CLINICAL_PROGRESSION: GRADUALLY IMPROVING
CLINICAL_PROGRESSION: GRADUALLY IMPROVING

## 2025-01-14 NOTE — DISCHARGE INSTRUCTIONS
You are found to have COVID-19 infection with underlying acute bronchitis or superimposed acute bronchitis.  There was no evidence of blood clot or pneumonia on his chest CT.  Your labs are otherwise unremarkable.  Due to your symptoms you have been put on steroid as well as inhaler.  However hide I change her antibiotic to doxycycline which is well-tolerated compared to erythromycin.  Do not use erythromycin.  He also has been put on nausea medicine.  If any chest pain short of breath or new symptom or worsening symptoms return to ER.  Since she has symptoms since 19 December and she had developed COVID-19 not sure whether started now or you had not several days ago you are out of window of opportunity for Paxlovid medicine to use for COVID-19 infection..  Still quarantine yourself for 5 days and follow with primary care physician however symptoms get worse anytime return to ER.

## 2025-01-14 NOTE — ED TRIAGE NOTES
Pt to ED c/o ongoing fever, cough, congestion, and body aches since December. Pt states he tested negative for flu, covid, and strep recently before beginning antibiotics. Pt first tried augmentin, and just finished a second course of antibiotics on Sunday which was zithromax. Pt states while on the antibiotics he felt better. Patient has continued fever, cough with yellow mucous, and fever. Patient is warm to the touch. Dr. Audra mack.

## 2025-01-14 NOTE — ED PROVIDER NOTES
[facility]   No chief complaint on file.      HPI  Patient reported emergency room with a complaint of cough body aches chills congestion symptoms started since December 19 he had outpatient COVID-19 and flu test done at that time which was negative.  Patient was on Augmentin without improvement he was put on erythromycin recently without any improvement and due to worsening symptoms decided come to ER for evaluation.  Denies any vomiting or diarrhea blood in stool or black stool.  He does drink alcohol 4 times a week but has not been drinking alcohol denies other substance abuse.  Denies chest pain hemoptysis pain or swelling in the legs or history of blood clot.  Denies history chronic GI cardiopulmonary problem.    Family history: Reviewed  Social history: Reviewed, denies substance abuse drinking alcohol.  Denies tobacco use or drug abuse.      Family history: Reviewed   Social history: Reviewed as described above denies substance abuse.  However drinks alcohol about 4 times a week has not been drinking alcohol lately ever since been sick.    Review of system: 10 review of system.  Review of system described in HPI otherwise negative       Patient History   No past medical history on file.  No past surgical history on file.  No family history on file.  Social History     Tobacco Use    Smoking status: Former     Types: Cigarettes    Smokeless tobacco: Current     Types: Snuff    Tobacco comments:     He is not a smoker, unknown if he quit.   Substance Use Topics    Alcohol use: Yes     Comment: Social    Drug use: Never       Physical Exam   ED Triage Vitals   Temp Pulse Resp BP   -- -- -- --      SpO2 Temp src Heart Rate Source Patient Position   -- -- -- --      BP Location FiO2 (%)     -- --       Physical Exam  Constitutional:       General: He is not in acute distress.     Appearance: Normal appearance. He is normal weight. He is ill-appearing. He is not toxic-appearing or diaphoretic.      Comments: Patient does  not feel well.  Looks sick but did not look toxic or in distress.  Upper airway congestion noted.  Talking without acute distress alert oriented x 3.    Noted via upper airway congestion.  Throat erythematous no edema x-ray discharge intact no drooling stridor noted neck is supple on auscultation he was breathing without acute respite distress no tachypnea hypoxemia respiratory distress clear breath sounds with but diminished breath sounds main shallow inspiration without any tachypnea or respiratory distress.  No hypoxemia noted heart regular rate and rhythm lungs auscultated abdomen soft positive bowel sound nontender no guarding rebound rigidity.  Calf muscle nontender Homans' sign negative.  No peripheral edema good peripheral pulses.  Heart regular rate and rhythm lungs auscultated.  No JVD good peripheral pulses no peripheral edema.  Neuro examination normal there was no nuchal rigidity spine nontender.  No motor or sensory deficit no arms or leg drift noted.  No nystagmus.  Cranial nerves II through XII gross intact.    No rashes, petechiae no ecchymosis or red streak..  No joint edema or tenderness   HENT:      Head: Normocephalic and atraumatic.      Right Ear: External ear normal.      Left Ear: External ear normal.      Nose: Nose normal. No congestion or rhinorrhea.      Mouth/Throat:      Mouth: Mucous membranes are moist.   Eyes:      Extraocular Movements: Extraocular movements intact.      Conjunctiva/sclera: Conjunctivae normal.      Pupils: Pupils are equal, round, and reactive to light.   Cardiovascular:      Rate and Rhythm: Normal rate and regular rhythm.      Pulses: Normal pulses.      Heart sounds: Normal heart sounds. No murmur heard.     No friction rub. No gallop.   Pulmonary:      Effort: Pulmonary effort is normal. No respiratory distress.      Breath sounds: Normal breath sounds. No stridor. No wheezing, rhonchi or rales.   Chest:      Chest wall: No tenderness.   Abdominal:       General: Abdomen is flat. Bowel sounds are normal. There is no distension.      Palpations: Abdomen is soft. There is no mass.      Tenderness: There is no abdominal tenderness. There is no right CVA tenderness, left CVA tenderness, guarding or rebound.      Hernia: No hernia is present.   Genitourinary:     Comments: No  complaint no CVA tenderness.  No complaint of testicular pain or swelling genital lesion.  Genital examination deferred  Musculoskeletal:         General: No swelling, deformity or signs of injury. Normal range of motion.      Cervical back: Normal range of motion and neck supple. No rigidity or tenderness.   Skin:     General: Skin is warm.      Capillary Refill: Capillary refill takes less than 2 seconds.      Coloration: Skin is not jaundiced or pale.      Findings: No bruising, erythema, lesion or rash.   Neurological:      General: No focal deficit present.      Mental Status: He is alert and oriented to person, place, and time.      Cranial Nerves: No cranial nerve deficit.      Sensory: No sensory deficit.      Motor: No weakness.      Coordination: Coordination normal.      Gait: Gait normal.   Psychiatric:         Mood and Affect: Mood normal.         Behavior: Behavior normal.           ED Course & MDM   Diagnoses as of 01/14/25 1540   COVID-19 virus infection   Acute bronchitis, unspecified organism                 No data recorded                                 Medical Decision Making  Patient reported emergency room with a complaint of respiratory symptoms cough congestion body ache and chills he has been treated with Augmentin as well as subsequently with Zithromax erythromycin but is now feeling better.  His previous COVID-19 and for the test was negative.    Upon examination he was not feeling well but no respiratory distress noted his HEENT examination grossly unremarkable neck is supple on auscultation had clear breath sounds.  Heart regular rate and rhythm neurovascularly  abdomen soft nontender no guarding rebound surgery.  No CVA tenderness noted.  Calf is nontender Homans' sign negative.  Throat erythematous without edema x-ray discharge he already finished Augmentin and has been taking erythromycin and as a result I did not order strep test.  However he had nasal swab done and he was found to have positive COVID-19 test report.    His labs are otherwise unremarkable I ordered CT of the chest to rule out pneumonia or PE as the chest x-ray was negative.  CT showed no evidence of infiltrate or PE.  He did not look toxic or in distress.  Hemodynamic stable breathing without acute distress no hypoxemia as result he is being discharged home with a prescription of doxycycline which is well-tolerated compared to erythromycin and Zofran and dexamethasone.  He is also out of window opportunity for Paxlovid treatment.  If symptoms get worse or new symptoms advised to return to ER.    Procedure  Procedures     Xiomara Zhu MD  01/14/25 1549       Xiomara Zhu MD  01/14/25 1544

## 2025-01-18 LAB
BACTERIA BLD CULT: NORMAL
BACTERIA BLD CULT: NORMAL

## 2025-06-03 DIAGNOSIS — M62.830 MUSCLE SPASM OF BACK: ICD-10-CM

## 2025-06-03 DIAGNOSIS — K21.9 GASTROESOPHAGEAL REFLUX DISEASE WITHOUT ESOPHAGITIS: ICD-10-CM

## 2025-06-03 RX ORDER — DULOXETIN HYDROCHLORIDE 60 MG/1
60 CAPSULE, DELAYED RELEASE ORAL DAILY
Qty: 90 CAPSULE | Refills: 1 | Status: SHIPPED | OUTPATIENT
Start: 2025-06-03

## 2025-06-03 RX ORDER — OMEPRAZOLE 40 MG/1
40 CAPSULE, DELAYED RELEASE ORAL DAILY
Qty: 90 CAPSULE | Refills: 3 | Status: SHIPPED | OUTPATIENT
Start: 2025-06-03